# Patient Record
(demographics unavailable — no encounter records)

---

## 2024-12-19 NOTE — HISTORY OF PRESENT ILLNESS
[de-identified] : Today I had the pleasure of seeing VALERIA SAUNDERS for follow up for chronic cough, ETD, and hyposmia History was obtained from patient, mother and chart.  s/p BMT and adenoidectomy 5/17/23, right tube extruded MRI orbit face neck 11/21/24 WNL on budesonide/albuterol [de-identified] : ear infection 2 weeks ago  itching and then pain MRI reviewed recurrent cough, croup

## 2024-12-19 NOTE — PHYSICAL EXAM
[Placement/Patency] : tympanostomy tube in place and patent [Clear/Ventilated] : middle ear clear and well ventilated [3+] : 3+ [Normal Gait and Station] : normal gait and station [Normal muscle strength, symmetry and tone of facial, head and neck musculature] : normal muscle strength, symmetry and tone of facial, head and neck musculature [Normal] : no cervical lymphadenopathy [Effusion] : no effusion [Exposed Vessel] : left anterior vessel not exposed [Increased Work of Breathing] : no increased work of breathing with use of accessory muscles and retractions

## 2024-12-19 NOTE — REVIEW OF SYSTEMS
[Negative] : Heme/Lymph [de-identified] : As per HPI [de-identified] : As per HPI [de-identified] : As per HPI

## 2024-12-19 NOTE — REASON FOR VISIT
[Subsequent Evaluation] : a subsequent evaluation for [Mother] : mother [FreeTextEntry2] : ETD, hyposmia

## 2024-12-19 NOTE — ASSESSMENT
[FreeTextEntry1] : VALERIA is a 3 year old girl presenting for eustachian tube dysfunction, chronic otitis media with effusion and recurrent pulmonary infections s/p BMT and adenoidectomy 5/17/23, right tube extruded.  recurrent pulm infections, chronic cough  - followed by pulm recommend continued pulm consider GI for globus  Eustachian Tube Dysfunction s/p BMT - right extruded - audiogram within normal limits 12/19/24  - expectant management, monitor PET q6months until extrusion  enlarged tonsils minimal symptoms - mild RAMO moderate in REM - offered T&A if symptoms progress  hyposmia/anosmia - allergy to flonase - MRI normal  Smell Training Smell each item 3 times a day for 20 seconds each.  DO this for 3 months. 1. Coffee Grounds 2. Citrus (Orange or Lemon Peel) 3. [place] or Perfume

## 2024-12-19 NOTE — CONSULT LETTER
[Dear  ___] : Dear  [unfilled], [Courtesy Letter:] : I had the pleasure of seeing your patient, [unfilled], in my office today. [Please see my note below.] : Please see my note below. [Consult Closing:] : Thank you very much for allowing me to participate in the care of this patient.  If you have any questions, please do not hesitate to contact me. [Sincerely,] : Sincerely, [FreeTextEntry2] : Dr. Anne Marie Linn  [address] [place] [address]  [phone] [FreeTextEntry3] : Lili Charles MD Pediatric Otolaryngology / Head and Neck Surgery    [facility] [address] [place] [address] Tel [phone] Fax [phone]    7 [facility], [facility] [address] [place] [address]  Tel [phone] Fax [phone]

## 2025-07-04 NOTE — REVIEW OF SYSTEMS
[Negative] : Heme/Lymph [de-identified] : As per HPI [de-identified] : As per HPI [de-identified] : As per HPI

## 2025-07-04 NOTE — HISTORY OF PRESENT ILLNESS
[de-identified] : Today I had the pleasure of seeing VALERIA SAUNDERS for follow up for chronic cough, ETD, and hyposmia History was obtained from patient, mother and chart.  s/p BMT and adenoidectomy 5/17/23, right tube extruded MRI orbit face neck 11/21/24 WNL on budesonide/albuterol [de-identified] : No recent ear infections or otorrhea Complains of ear irritation after swimming, no otorrhea.  hyposmia has resolved- did smell training for a short time.  Cough only when sick, sometimes croupy, sometimes congested, Typically resolves with nebulizer and shower steam- improving.  No snoring  No chronic nasal congestion

## 2025-07-04 NOTE — ASSESSMENT
[FreeTextEntry1] : VALERIA is a 3 year old girl presenting for eustachian tube dysfunction, chronic otitis media with effusion and recurrent pulmonary infections s/p BMT and adenoidectomy 5/17/23 AU extruded  recurrent pulm infections, chronic cough  - followed by pulm recommend continued pulm consider GI for globus  Eustachian Tube Dysfunction s/p BMT AU extruded - audiogram 7/3/25 within normal limits  enlarged tonsils minimal symptoms - mild RAMO moderate in REM - offered T&A if symptoms progress in the past  hyposmia/anosmia resolved - allergy to flonase - MRI normal  reassurance follow up as needed

## 2025-07-04 NOTE — CONSULT LETTER
[Dear  ___] : Dear  [unfilled], [Courtesy Letter:] : I had the pleasure of seeing your patient, [unfilled], in my office today. [Please see my note below.] : Please see my note below. [Consult Closing:] : Thank you very much for allowing me to participate in the care of this patient.  If you have any questions, please do not hesitate to contact me. [Sincerely,] : Sincerely, [FreeTextEntry2] : Dr. Anne Marie Linn  [address] [place] [address]  [phone] [FreeTextEntry3] : Lili Charles MD Pediatric Otolaryngology / Head and Neck Surgery    [facility] [address] [place] [address] Tel [phone] Fax [phone]    [address] [facility], [facility] [address] [place] [address]  Tel [phone] Fax [phone]

## 2025-07-04 NOTE — CONSULT LETTER
[Dear  ___] : Dear  [unfilled], [Courtesy Letter:] : I had the pleasure of seeing your patient, [unfilled], in my office today. [Please see my note below.] : Please see my note below. [Consult Closing:] : Thank you very much for allowing me to participate in the care of this patient.  If you have any questions, please do not hesitate to contact me. [Sincerely,] : Sincerely, [FreeTextEntry2] : Dr. Anne Marie Linn  [address] [place] [address]  [phone] [FreeTextEntry3] : Lili Charles MD Pediatric Otolaryngology / Head and Neck Surgery    [facility] [address] [place] [address] Tel [phone] Fax [phone]    [address] [facility], [facility] [address] [place] [address]  Tel [phone] Fax [phone]

## 2025-07-04 NOTE — PHYSICAL EXAM
[Normal Gait and Station] : normal gait and station [Normal muscle strength, symmetry and tone of facial, head and neck musculature] : normal muscle strength, symmetry and tone of facial, head and neck musculature [Normal] : no cervical lymphadenopathy [2+] : 2+ [Effusion] : no effusion [Exposed Vessel] : left anterior vessel not exposed [Increased Work of Breathing] : no increased work of breathing with use of accessory muscles and retractions [de-identified] : extruded tube in canal

## 2025-07-04 NOTE — REVIEW OF SYSTEMS
[Negative] : Heme/Lymph [de-identified] : As per HPI [de-identified] : As per HPI [de-identified] : As per HPI

## 2025-07-04 NOTE — HISTORY OF PRESENT ILLNESS
[de-identified] : Today I had the pleasure of seeing VALERIA SAUNDERS for follow up for chronic cough, ETD, and hyposmia History was obtained from patient, mother and chart.  s/p BMT and adenoidectomy 5/17/23, right tube extruded MRI orbit face neck 11/21/24 WNL on budesonide/albuterol [de-identified] : No recent ear infections or otorrhea Complains of ear irritation after swimming, no otorrhea.  hyposmia has resolved- did smell training for a short time.  Cough only when sick, sometimes croupy, sometimes congested, Typically resolves with nebulizer and shower steam- improving.  No snoring  No chronic nasal congestion